# Patient Record
Sex: FEMALE | Race: AMERICAN INDIAN OR ALASKA NATIVE
[De-identification: names, ages, dates, MRNs, and addresses within clinical notes are randomized per-mention and may not be internally consistent; named-entity substitution may affect disease eponyms.]

---

## 2019-08-12 ENCOUNTER — HOSPITAL ENCOUNTER (EMERGENCY)
Dept: HOSPITAL 5 - ED | Age: 44
Discharge: HOME | End: 2019-08-12
Payer: COMMERCIAL

## 2019-08-12 VITALS — SYSTOLIC BLOOD PRESSURE: 136 MMHG | DIASTOLIC BLOOD PRESSURE: 99 MMHG

## 2019-08-12 DIAGNOSIS — Z79.899: ICD-10-CM

## 2019-08-12 DIAGNOSIS — I10: ICD-10-CM

## 2019-08-12 DIAGNOSIS — J06.9: Primary | ICD-10-CM

## 2019-08-12 DIAGNOSIS — Z98.51: ICD-10-CM

## 2019-08-12 PROCEDURE — 71046 X-RAY EXAM CHEST 2 VIEWS: CPT

## 2019-08-12 NOTE — XRAY REPORT
CHEST 2 VIEWS 



INDICATION / CLINICAL INFORMATION:

productive cough.



COMPARISON: 

None available.



FINDINGS:



SUPPORT DEVICES: None.

HEART / MEDIASTINUM: No significant abnormality. 

LUNGS / PLEURA: No significant pulmonary or pleural abnormality. No pneumothorax. 



ADDITIONAL FINDINGS: No significant additional findings.



IMPRESSION:

No acute findings.



Signer Name: Jose Shaw MD 

Signed: 8/12/2019 12:55 PM

 Workstation Name: [id number]

## 2019-08-12 NOTE — EMERGENCY DEPARTMENT REPORT
- General


Chief Complaint: Upper Respiratory Infection


Stated Complaint: COUGH/SINUS/HEADACHE


Time Seen by Provider: 08/12/19 12:11


Source: patient


Mode of arrival: Ambulatory


Limitations: No Limitations





- History of Present Illness


Initial Comments: 





Patient is a 44-year-old  female who's had cough, congestion for 

approximately 10 days.  Patient states cough is productive of yellow sputum.  

Patient's has some rhinorrhea and facial congestion.  Patient said possible 

fevers as well.  Patient denies nausea vomiting diarrhea sore throat or neck 

stiffness.





- Related Data


                                  Previous Rx's











 Medication  Instructions  Recorded  Last Taken  Type


 


ALBUTEROL Inhaler (OR & NICU) 2 puff IH QID PRN #1 inhalation 08/12/19 Unknown 

Rx





[ProAir HFA Inhaler]    


 


Azithromycin [Zithromax Z-DARRYL] 250 mg PO DAILY #6 tablet 08/12/19 Unknown Rx


 


predniSONE [Deltasone] 20 mg PO QDAY #5 tab 08/12/19 Unknown Rx











                                    Allergies











Allergy/AdvReac Type Severity Reaction Status Date / Time


 


No Known Allergies Allergy   Unverified 08/12/19 10:45














ED Review of Systems


ROS: 


Stated complaint: COUGH/SINUS/HEADACHE


Other details as noted in HPI





Comment: All other systems reviewed and negative





ED Past Medical Hx





- Past Medical History


Previous Medical History?: Yes


Hx Hypertension: Yes





- Surgical History


Past Surgical History?: Yes


Additional Surgical History: tubal ligation foot surgery





- Social History


Smoking Status: Never Smoker


Substance Use Type: None





- Medications


Home Medications: 


                                Home Medications











 Medication  Instructions  Recorded  Confirmed  Last Taken  Type


 


ALBUTEROL Inhaler (OR & NICU) 2 puff IH QID PRN #1 inhalation 08/12/19  Unknown 

Rx





[ProAir HFA Inhaler]     


 


Azithromycin [Zithromax Z-DARRYL] 250 mg PO DAILY #6 tablet 08/12/19  Unknown Rx


 


predniSONE [Deltasone] 20 mg PO QDAY #5 tab 08/12/19  Unknown Rx














ED Physical Exam





- General


Limitations: No Limitations


General appearance: alert, in no apparent distress





- Head


Head exam: Present: atraumatic, normocephalic





- Eye


Eye exam: Present: normal appearance





- ENT


ENT exam: Present: mucous membranes moist





- Neck


Neck exam: Present: normal inspection





- Respiratory


Respiratory exam: Present: normal lung sounds bilaterally, rhonchi.  Absent: 

respiratory distress, wheezes, rales, stridor





- Cardiovascular


Cardiovascular Exam: Present: regular rate, normal rhythm.  Absent: systolic 

murmur, diastolic murmur, rubs, gallop





- GI/Abdominal


GI/Abdominal exam: Present: soft, normal bowel sounds.  Absent: distended, 

tenderness, guarding, rebound





- Extremities Exam


Extremities exam: Present: normal inspection





- Back Exam


Back exam: Present: normal inspection





- Neurological Exam


Neurological exam: Present: alert, oriented X3





- Psychiatric


Psychiatric exam: Present: normal affect, normal mood





- Skin


Skin exam: Present: warm, dry, intact, normal color.  Absent: rash





ED Course





                                   Vital Signs











  08/12/19





  10:47


 


Temperature 98.4 F


 


Pulse Rate 97 H


 


Respiratory 20





Rate 


 


Blood Pressure 136/99


 


O2 Sat by Pulse 100





Oximetry 














ED Medical Decision Making





- Radiology Data


Radiology results: report reviewed





- Medical Decision Making





Patient is a 44-year-old Female presenting with a productive cough for 10 days. 

 Patient did have some scattered rhonchi that cleared with coughing.  Patient be

 started on meds for symptomatic relief be discharged home.


Critical care attestation.: 


If time is entered above; I have spent that time in minutes in the direct care 

of this critically ill patient, excluding procedure time.








ED Disposition


Clinical Impression: 


Upper respiratory infection


Qualifiers:


 URI type: unspecified URI Qualified Code(s): J06.9 - Acute upper respiratory 

infection, unspecified





Disposition: DC-01 TO HOME OR SELFCARE


Is pt being admited?: No


Does the pt Need Aspirin: No


Condition: Stable


Instructions:  Upper Respiratory Infection (ED)


Referrals: 


[facility], [place] [Primary Care Provider] - 3-5 Days


Time of Disposition: 13:17

## 2021-01-03 ENCOUNTER — OUT OF OFFICE VISIT (OUTPATIENT)
Dept: URBAN - METROPOLITAN AREA MEDICAL CENTER 28 | Facility: MEDICAL CENTER | Age: 46
End: 2021-01-03
Payer: SELF-PAY

## 2021-01-03 DIAGNOSIS — R74.01 ELEVATED ALANINE AMINOTRANSFERASE (ALT) LEVEL: ICD-10-CM

## 2021-01-03 DIAGNOSIS — K31.89 ACQUIRED DEFORMITY OF DUODENUM: ICD-10-CM

## 2021-01-03 DIAGNOSIS — K20.80 ESOPHAGITIS, LOS ANGELES GRADE B: ICD-10-CM

## 2021-01-03 DIAGNOSIS — R11.2 ACUTE NAUSEA WITH NONBILIOUS VOMITING: ICD-10-CM

## 2021-01-03 PROCEDURE — 43239 EGD BIOPSY SINGLE/MULTIPLE: CPT | Performed by: INTERNAL MEDICINE

## 2021-01-03 PROCEDURE — 99254 IP/OBS CNSLTJ NEW/EST MOD 60: CPT | Performed by: INTERNAL MEDICINE

## 2021-01-05 ENCOUNTER — OUT OF OFFICE VISIT (OUTPATIENT)
Dept: URBAN - METROPOLITAN AREA MEDICAL CENTER 28 | Facility: MEDICAL CENTER | Age: 46
End: 2021-01-05
Payer: SELF-PAY

## 2021-01-05 DIAGNOSIS — E87.6 HYPOKALEMIA: ICD-10-CM

## 2021-01-05 DIAGNOSIS — K20.80 ESOPHAGITIS, LOS ANGELES GRADE B: ICD-10-CM

## 2021-01-05 DIAGNOSIS — R74.01 ELEVATED ALANINE AMINOTRANSFERASE (ALT) LEVEL: ICD-10-CM

## 2021-01-05 DIAGNOSIS — R11.2 ACUTE NAUSEA WITH NONBILIOUS VOMITING: ICD-10-CM

## 2021-01-05 PROCEDURE — 99232 SBSQ HOSP IP/OBS MODERATE 35: CPT | Performed by: INTERNAL MEDICINE

## 2021-03-02 ENCOUNTER — LAB OUTSIDE AN ENCOUNTER (OUTPATIENT)
Dept: URBAN - METROPOLITAN AREA CLINIC 96 | Facility: CLINIC | Age: 46
End: 2021-03-02

## 2021-03-02 ENCOUNTER — WEB ENCOUNTER (OUTPATIENT)
Dept: URBAN - METROPOLITAN AREA CLINIC 96 | Facility: CLINIC | Age: 46
End: 2021-03-02

## 2021-03-02 ENCOUNTER — OFFICE VISIT (OUTPATIENT)
Dept: URBAN - METROPOLITAN AREA CLINIC 96 | Facility: CLINIC | Age: 46
End: 2021-03-02
Payer: SELF-PAY

## 2021-03-02 ENCOUNTER — TELEPHONE ENCOUNTER (OUTPATIENT)
Dept: URBAN - METROPOLITAN AREA CLINIC 92 | Facility: CLINIC | Age: 46
End: 2021-03-02

## 2021-03-02 DIAGNOSIS — K21.9 GASTROESOPHAGEAL REFLUX DISEASE, UNSPECIFIED WHETHER ESOPHAGITIS PRESENT: ICD-10-CM

## 2021-03-02 DIAGNOSIS — R74.8 ABNORMAL LIVER ENZYMES: ICD-10-CM

## 2021-03-02 DIAGNOSIS — K76.0 FATTY LIVER: ICD-10-CM

## 2021-03-02 DIAGNOSIS — Z12.11 SCREEN FOR COLON CANCER: ICD-10-CM

## 2021-03-02 DIAGNOSIS — K22.70 BARRETT'S ESOPHAGUS WITHOUT DYSPLASIA: ICD-10-CM

## 2021-03-02 DIAGNOSIS — D64.9 ANEMIA, UNSPECIFIED TYPE: ICD-10-CM

## 2021-03-02 DIAGNOSIS — Z78.9 DRUG INTOLERANCE: ICD-10-CM

## 2021-03-02 DIAGNOSIS — H46.9 OPTIC NEURITIS: ICD-10-CM

## 2021-03-02 PROBLEM — 66760008: Status: ACTIVE | Noted: 2021-03-02

## 2021-03-02 PROBLEM — 302914006: Status: ACTIVE | Noted: 2021-03-02

## 2021-03-02 PROBLEM — 166643006: Status: ACTIVE | Noted: 2021-03-02

## 2021-03-02 PROBLEM — 59037007: Status: ACTIVE | Noted: 2021-03-02

## 2021-03-02 PROBLEM — 235595009: Status: ACTIVE | Noted: 2021-03-02

## 2021-03-02 PROBLEM — 197321007: Status: ACTIVE | Noted: 2021-03-02

## 2021-03-02 PROBLEM — 271737000: Status: ACTIVE | Noted: 2021-03-02

## 2021-03-02 LAB
ABSOLUTE BASOPHIL COUNT: 0.03
ABSOLUTE EOSINOPHIL COUNT: 0.08
ABSOLUTE IMMATURE GRANULOCYTE  COUNT: 0.08
ABSOLUTE LYMPHOCYTE COUNT: 1.89
ABSOLUTE MONOCYTE COUNT: 0.48
ABSOLUTE NEUTROPHIL COUNT (ANC): 4.21
ABSOLUTE NRBC  COUNT: 0
AG RATIO: 1
ALBUMIN LEVEL: 4
ALK PHOS: 106
ALT: 10
ANION GAP: 10
AST: 15
BASOPHIL AUTO: 0
BILIRUBIN TOTAL: 0.3
BUN/CREAT RATIO: 10
BUN: 9
CALCIUM LEVEL: 9.2
CHLORIDE LEVEL: 107
CO2 LEVEL: 24
CREATININE LEVEL: 0.9
EOS AUTO: 1
FERRITIN LEVEL: 103.3
GFR AFRICAN AMERICAN: >60
GFR NON AFRICAN AMERICAN: >60
GLUCOSE LEVEL: 102
HCT: 38.9
HGB: 12
IMMATURE GRANULOCYTES AUTO: 1.2
IRON LEVEL: 46
IRON SAT: 18
LYMPH AUTO: 28
MCH: 28.8
MCHC: 30.8
MCV: 93.5
MONO AUTO: 7
MPV: 10.2
NEUTRO AUTO: 62
NRBC AUTO: 0
OSMO (CALC): 280
PERFORMING LAB: (no result)
PLATELETS: 287
POTASSIUM LEVEL: 3.9
PROTEIN TOTAL: 7.9
RBC: 4.16
RDW: 15.4
SODIUM LEVEL: 141
TIBC: 259
WBC: 6.8

## 2021-03-02 PROCEDURE — 99203 OFFICE O/P NEW LOW 30 MIN: CPT | Performed by: INTERNAL MEDICINE

## 2021-03-02 RX ORDER — PANTOPRAZOLE SODIUM 40 MG/1
1 TABLET TABLET, DELAYED RELEASE ORAL ONCE A DAY
Qty: 90 | Refills: 3 | OUTPATIENT
Start: 2021-03-02

## 2021-03-02 NOTE — HPI-OTHER HISTORIES
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 lives w mother seen in hospital 1/2021 for N and V. egd-gerd/barretts d/makayla on protonix/carafate. latter ?? leads to early satiety no N and V now. felt better on  the PPI. bms are qod noted to have an anemia and abn lfts while in hospital.

## 2021-03-12 PROBLEM — 305058001: Status: ACTIVE | Noted: 2021-03-02

## 2021-03-23 ENCOUNTER — OFFICE VISIT (OUTPATIENT)
Dept: URBAN - METROPOLITAN AREA MEDICAL CENTER 28 | Facility: MEDICAL CENTER | Age: 46
End: 2021-03-23

## 2021-04-02 ENCOUNTER — OFFICE VISIT (OUTPATIENT)
Dept: URBAN - METROPOLITAN AREA CLINIC 17 | Facility: CLINIC | Age: 46
End: 2021-04-02

## 2021-04-20 ENCOUNTER — TELEPHONE ENCOUNTER (OUTPATIENT)
Dept: URBAN - METROPOLITAN AREA CLINIC 96 | Facility: CLINIC | Age: 46
End: 2021-04-20

## 2021-04-20 RX ORDER — PANTOPRAZOLE SODIUM 40 MG/1
1 TABLET TABLET, DELAYED RELEASE ORAL ONCE A DAY
Qty: 90 | Refills: 3
Start: 2021-03-02

## 2021-06-22 ENCOUNTER — DASHBOARD ENCOUNTERS (OUTPATIENT)
Age: 46
End: 2021-06-22

## 2021-06-30 ENCOUNTER — OFFICE VISIT (OUTPATIENT)
Dept: URBAN - METROPOLITAN AREA CLINIC 96 | Facility: CLINIC | Age: 46
End: 2021-06-30

## 2021-06-30 RX ORDER — PANTOPRAZOLE SODIUM 40 MG/1
1 TABLET TABLET, DELAYED RELEASE ORAL ONCE A DAY
Qty: 90 | Refills: 3 | Status: ACTIVE | COMMUNITY
Start: 2021-03-02